# Patient Record
Sex: FEMALE | Race: WHITE | NOT HISPANIC OR LATINO
[De-identification: names, ages, dates, MRNs, and addresses within clinical notes are randomized per-mention and may not be internally consistent; named-entity substitution may affect disease eponyms.]

---

## 2024-06-01 ENCOUNTER — NON-APPOINTMENT (OUTPATIENT)
Age: 24
End: 2024-06-01

## 2025-01-14 ENCOUNTER — EMERGENCY (EMERGENCY)
Facility: HOSPITAL | Age: 25
LOS: 1 days | Discharge: HOME | End: 2025-01-14
Attending: EMERGENCY MEDICINE | Admitting: EMERGENCY MEDICINE
Payer: OTHER MISCELLANEOUS

## 2025-01-14 VITALS
WEIGHT: 136.91 LBS | RESPIRATION RATE: 16 BRPM | OXYGEN SATURATION: 99 % | SYSTOLIC BLOOD PRESSURE: 124 MMHG | HEART RATE: 74 BPM | TEMPERATURE: 98 F | DIASTOLIC BLOOD PRESSURE: 83 MMHG

## 2025-01-14 PROCEDURE — 99283 EMERGENCY DEPT VISIT LOW MDM: CPT

## 2025-01-14 NOTE — ED PROVIDER NOTE - CLINICAL SUMMARY MEDICAL DECISION MAKING FREE TEXT BOX
healthy female presents s/p needle stick on 2nd digit left hand. as far as pt knows, source pt is hiv and hep neg. anm aware. employee chart filled out and anm to fill out source pt. no indication for intervention. tdap utd.

## 2025-01-14 NOTE — ED PROVIDER NOTE - PATIENT PORTAL LINK FT
You can access the FollowMyHealth Patient Portal offered by [facility] by registering at the following website: [email or web address]. By joining [facility]’s FollowMyHealth portal, you will also be able to view your health information using other applications (apps) compatible with our system.

## 2025-01-14 NOTE — ED PROVIDER NOTE - NSFOLLOWUPINSTRUCTIONS_ED_ALL_ED_FT
Needle Stick Injuries    WHAT YOU NEED TO KNOW:    What do I need to know about needle stick injuries? Needle stick injuries usually happen to healthcare workers in hospitals, clinics, and labs. Needle stick injuries can also happen at home or in the community if needles are not discarded properly. Used needles may have blood or body fluids that carry HIV, the hepatitis B virus (HBV), or the hepatitis C virus (HCV). The virus can spread to a person who gets pricked by a needle used on an infected person.    How do needle stick injuries occur? Needle stick injuries usually happen by accident. [place] may cause injury to you or to someone else if they were not properly discarded after use. An injury can also occur if you do not use gloves to protect your hands while you work with needles.    What should I do if I have a needle stick injury?    Clean the area immediately. Wash the wound with soap and water.    Contact your healthcare provider as soon as possible. Your provider will ask you when the injury happened. Tell provider about the type and amount of blood or fluid the needle was exposed to. The provider will also want to know if the needle was used on a person who has an infection. Tell your provider if you have had any vaccines. You will also need blood tests.  How are needle stick injuries treated? Postexposure prophylaxis (PEP) may be needed. PEP is treatment that may protect a person from infection after exposure to another person's body fluids. PEP may be needed if the person whose fluids you were exposed to has a known infection. Do not donate blood, organs, tissues, or semen until your follow-up is completed at 6 months.    PEP for HBV may include HBV vaccinations or medicine to prevent HBV. This treatment works best if started within 24 hours of exposure.    PEP for HIV may include 2 or 3 types of medicine to prevent HIV. This treatment works best if started within 72 hours of exposure. Continue treatment for 4 weeks. Practice safe sex to prevent spreading HIV and to prevent pregnancy during the follow-up period. If you are breastfeeding, your healthcare provider may recommend that you stop. Ask your healthcare provider if you can breastfeed.    PEP for HCV is not available. You will need to be tested for HCV and treated if you were infected.    A Td vaccine is a booster shot used to help prevent diphtheria and tetanus. The Td booster may be given to adolescents and adults for certain wounds and injuries.  When should I follow up with my healthcare provider? Follow up with your healthcare provider as directed. You will need more blood tests. You will also need to make sure your medicines are working. PEP for HIV often causes side effects. Talk with your provider about your symptoms. Your provider will need to make sure you are taking the medicine correctly. Write down your questions so you remember to ask them during your visits.    What can I do to prevent needle stick injuries?    Always use gloves when you handle needles that are exposed to blood or other body fluids. You may want to use 2 pairs of gloves for extra protection.    Do not recap needles after use. Recapping needles increases your risk for a needle stick.    Throw away needles in a safe container. A hard container with a lid may prevent accidental needle sticks.  CARE AGREEMENT:    You have the right to help plan your care. Learn about your health condition and how it may be treated. Discuss treatment options with your healthcare providers to decide what care you want to receive. You always have the right to refuse treatment.

## 2025-01-31 ENCOUNTER — APPOINTMENT (OUTPATIENT)
Dept: PLASTIC SURGERY | Facility: CLINIC | Age: 25
End: 2025-01-31
Payer: COMMERCIAL

## 2025-01-31 VITALS — WEIGHT: 140 LBS | HEIGHT: 65 IN | BODY MASS INDEX: 23.32 KG/M2

## 2025-01-31 DIAGNOSIS — D17.21 BENIGN LIPOMATOUS NEOPLASM OF SKIN AND SUBCUTANEOUS TISSUE OF RIGHT ARM: ICD-10-CM

## 2025-01-31 PROCEDURE — 99203 OFFICE O/P NEW LOW 30 MIN: CPT

## 2025-03-14 NOTE — ED PROVIDER NOTE - PSYCHIATRIC, MLM
Pt here for count check today. 1L NS given for dehydration. Pt tolerated well. Pt reported having sore throat. COVID, Respiratory, and pneumonia swabs taken and sent to lab. No mouth sores noted at today's visit. Pt left infusion ambulatory in [facility].   Alert and oriented to person, place, time/situation. normal mood and affect. no apparent risk to self or others.

## 2025-07-10 ENCOUNTER — NON-APPOINTMENT (OUTPATIENT)
Age: 25
End: 2025-07-10

## 2025-07-11 ENCOUNTER — NON-APPOINTMENT (OUTPATIENT)
Age: 25
End: 2025-07-11

## 2025-07-11 ENCOUNTER — LABORATORY RESULT (OUTPATIENT)
Age: 25
End: 2025-07-11

## 2025-07-11 ENCOUNTER — APPOINTMENT (OUTPATIENT)
Dept: INTERNAL MEDICINE | Facility: CLINIC | Age: 25
End: 2025-07-11
Payer: COMMERCIAL

## 2025-07-11 VITALS
BODY MASS INDEX: 23.99 KG/M2 | DIASTOLIC BLOOD PRESSURE: 79 MMHG | HEART RATE: 77 BPM | HEIGHT: 65 IN | WEIGHT: 144 LBS | OXYGEN SATURATION: 99 % | SYSTOLIC BLOOD PRESSURE: 135 MMHG

## 2025-07-11 VITALS — SYSTOLIC BLOOD PRESSURE: 130 MMHG | DIASTOLIC BLOOD PRESSURE: 80 MMHG

## 2025-07-11 PROCEDURE — 99385 PREV VISIT NEW AGE 18-39: CPT

## 2025-07-15 LAB
25(OH)D3 SERPL-MCNC: 26.6 NG/ML
ALBUMIN SERPL ELPH-MCNC: 5 G/DL
ALP BLD-CCNC: 41 U/L
ALT SERPL-CCNC: 25 U/L
ANION GAP SERPL CALC-SCNC: 14 MMOL/L
AST SERPL-CCNC: 22 U/L
BASOPHILS # BLD AUTO: 0.06 K/UL
BASOPHILS NFR BLD AUTO: 0.8 %
BILIRUB SERPL-MCNC: 0.4 MG/DL
BUN SERPL-MCNC: 15 MG/DL
CALCIUM SERPL-MCNC: 10.3 MG/DL
CHLORIDE SERPL-SCNC: 104 MMOL/L
CHOLEST SERPL-MCNC: 205 MG/DL
CO2 SERPL-SCNC: 21 MMOL/L
CREAT SERPL-MCNC: 1.01 MG/DL
EGFRCR SERPLBLD CKD-EPI 2021: 80 ML/MIN/1.73M2
EOSINOPHIL # BLD AUTO: 0.19 K/UL
EOSINOPHIL NFR BLD AUTO: 2.7 %
ESTIMATED AVERAGE GLUCOSE: 91 MG/DL
GLUCOSE SERPL-MCNC: 77 MG/DL
HBA1C MFR BLD HPLC: 4.8 %
HCT VFR BLD CALC: 38.1 %
HCV AB SER QL: NONREACTIVE
HCV S/CO RATIO: 0.2 S/CO
HDLC SERPL-MCNC: 78 MG/DL
HGB BLD-MCNC: 11.7 G/DL
HIV1+2 AB SPEC QL IA.RAPID: NONREACTIVE
IMM GRANULOCYTES NFR BLD AUTO: 0.1 %
LDLC SERPL-MCNC: 118 MG/DL
LYMPHOCYTES # BLD AUTO: 2.53 K/UL
LYMPHOCYTES NFR BLD AUTO: 35.7 %
MAN DIFF?: NORMAL
MCHC RBC-ENTMCNC: 19.1 PG
MCHC RBC-ENTMCNC: 30.7 G/DL
MCV RBC AUTO: 62.2 FL
MONOCYTES # BLD AUTO: 0.48 K/UL
MONOCYTES NFR BLD AUTO: 6.8 %
NEUTROPHILS # BLD AUTO: 3.82 K/UL
NEUTROPHILS NFR BLD AUTO: 53.9 %
NONHDLC SERPL-MCNC: 127 MG/DL
PLATELET # BLD AUTO: 214 K/UL
POTASSIUM SERPL-SCNC: 3.9 MMOL/L
PROT SERPL-MCNC: 7.6 G/DL
RBC # BLD: 6.13 M/UL
RBC # FLD: 17.2 %
SODIUM SERPL-SCNC: 140 MMOL/L
TRIGL SERPL-MCNC: 54 MG/DL
TSH SERPL-ACNC: 1.23 UIU/ML
WBC # FLD AUTO: 7.09 K/UL